# Patient Record
Sex: MALE | Race: WHITE | NOT HISPANIC OR LATINO | URBAN - METROPOLITAN AREA
[De-identification: names, ages, dates, MRNs, and addresses within clinical notes are randomized per-mention and may not be internally consistent; named-entity substitution may affect disease eponyms.]

---
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
## 2019-08-20 NOTE — H&P PST ADULT - CARDIOVASCULAR
details… detailed exam Bilateral Helical Rim Advancement Flap Text: The defect edges were debeveled with a #15 blade scalpel.  Given the location of the defect and the proximity to free margins (helical rim) a bilateral helical rim advancement flap was deemed most appropriate.  Using a sterile surgical marker, the appropriate advancement flaps were drawn incorporating the defect and placing the expected incisions between the helical rim and antihelix where possible.  The area thus outlined was incised through and through with a #15 scalpel blade.  With a skin hook and iris scissors, the flaps were gently and sharply undermined and freed up.

## 2021-04-11 ENCOUNTER — TRANSCRIPTION ENCOUNTER (OUTPATIENT)
Age: 21
End: 2021-04-11

## 2023-03-23 NOTE — DISCHARGE NOTE ADULT - PROVIDER TOKENS
Received call from pt  He is having kidney pain rating it at a 10/10  He has had kidney stones in the past and it feels just like this    Advised to pt to go to the ER to be seen for diagnosis and treatment  Pt verbalized understanding and agrees to the plan    Thank you  Norberto Roach RN on 3/23/2023 at 7:54 AM   TOKEN:'[id number]'

## 2024-04-10 NOTE — ASU PATIENT PROFILE, ADULT - NSSUBSTANCEUSE_GEN_ALL_CORE_SD
Pt comes in limping reporting falling down 5 stairs while flexing R knee yesterday. Pt denies hitting head or LOC.   
caffeine

## 2024-06-06 NOTE — H&P ADULT - REASON FOR ADMISSION
Dentofacial deformity [No Acute Distress] : no acute distress [No Respiratory Distress] : no respiratory distress